# Patient Record
Sex: FEMALE | Race: WHITE | NOT HISPANIC OR LATINO | Employment: UNEMPLOYED | URBAN - METROPOLITAN AREA
[De-identification: names, ages, dates, MRNs, and addresses within clinical notes are randomized per-mention and may not be internally consistent; named-entity substitution may affect disease eponyms.]

---

## 2018-03-19 ENCOUNTER — OFFICE VISIT (OUTPATIENT)
Dept: NEUROSURGERY | Facility: CLINIC | Age: 38
End: 2018-03-19

## 2018-03-19 VITALS
TEMPERATURE: 99.3 F | BODY MASS INDEX: 24.69 KG/M2 | SYSTOLIC BLOOD PRESSURE: 138 MMHG | DIASTOLIC BLOOD PRESSURE: 92 MMHG | HEIGHT: 64 IN | WEIGHT: 144.6 LBS

## 2018-03-19 DIAGNOSIS — M51.36 BULGING LUMBAR DISC: Primary | ICD-10-CM

## 2018-03-19 PROCEDURE — 99203 OFFICE O/P NEW LOW 30 MIN: CPT | Performed by: NEUROLOGICAL SURGERY

## 2018-03-19 RX ORDER — HYDROCODONE BITARTRATE AND ACETAMINOPHEN 10; 325 MG/1; MG/1
TABLET ORAL
COMMUNITY
Start: 2018-03-01

## 2018-03-19 RX ORDER — SUMATRIPTAN 100 MG/1
TABLET, FILM COATED ORAL
COMMUNITY
Start: 2018-01-19

## 2018-03-19 RX ORDER — OMEPRAZOLE 20 MG/1
CAPSULE, DELAYED RELEASE ORAL
COMMUNITY
Start: 2018-02-13

## 2018-03-19 NOTE — PROGRESS NOTES
NAME: IRIS SINGH   DOS: 3/19/2018  : 1980  PCP: Jewel Vasquez MD    Chief Complaint:  Back pain urinary incontinence  Chief Complaint   Patient presents with   • Back Pain       History of Present Illness:  37 y.o. female   This is a 37-year-old female with a history of some chronic back and work-related injuries.  She is relatively functional and interestingly motor she reports about a 2 month history of back pain with radiation in the left groin after lifting a piece of furniture.  The pain was very severe radiated into her groin she also has complaints of some abdominal pain she's actually reporting that she is a candidate for an abdominal hysterectomy.  She denies symptoms of neurogenic claudication but has patchy distribution sensation issues all the way down the leg ever since this happened some of which have resolved.  She denies any amalia weakness the pain is mostly in the groin and radiates around her left L5 distribution into the groin but does have some diffuse abdominal issues being addressed by her GYN she reports also a history of a rash that broke out after this in her legs associated with some necrosis of her toes that is being worked up as well that is unclear as to the etiology    PMHX  Allergies:  Allergies   Allergen Reactions   • Amoxicillin Hives     Medications    Current Outpatient Prescriptions:   •  HYDROcodone-acetaminophen (NORCO)  MG per tablet, , Disp: , Rfl:   •  omeprazole (priLOSEC) 20 MG capsule, , Disp: , Rfl:   •  SUMAtriptan (IMITREX) 100 MG tablet, , Disp: , Rfl:   Past Medical History:  Past Medical History:   Diagnosis Date   • Headache      Past Surgical History:  History reviewed. No pertinent surgical history.  Social Hx:  Social History   Substance Use Topics   • Smoking status: Current Every Day Smoker     Packs/day: 0.50     Types: Cigarettes   • Smokeless tobacco: Never Used   • Alcohol use Yes      Comment: moderate     Family Hx:  Family History    Problem Relation Age of Onset   • No Known Problems Mother    • Cancer Father    • Cancer Sister      Review of Systems:        Review of Systems   Constitutional: Negative for activity change, appetite change, chills, diaphoresis, fatigue, fever and unexpected weight change.   HENT: Negative for congestion, dental problem, drooling, ear discharge, ear pain, facial swelling, hearing loss, mouth sores, nosebleeds, postnasal drip, rhinorrhea, sinus pressure, sneezing, sore throat, tinnitus, trouble swallowing and voice change.    Eyes: Negative for photophobia, pain, discharge, redness, itching and visual disturbance.   Respiratory: Negative for apnea, cough, choking, chest tightness, shortness of breath, wheezing and stridor.    Cardiovascular: Negative for chest pain, palpitations and leg swelling.   Gastrointestinal: Negative for abdominal distention, abdominal pain, anal bleeding, blood in stool, constipation, diarrhea, nausea, rectal pain and vomiting.   Endocrine: Negative for cold intolerance, heat intolerance, polydipsia, polyphagia and polyuria.   Genitourinary: Negative for decreased urine volume, difficulty urinating, dysuria, enuresis, flank pain, frequency, genital sores, hematuria and urgency.   Musculoskeletal: Positive for back pain. Negative for arthralgias, gait problem, joint swelling, myalgias, neck pain and neck stiffness.   Skin: Negative for color change, pallor, rash and wound.   Allergic/Immunologic: Negative for environmental allergies, food allergies and immunocompromised state.   Neurological: Negative for dizziness, tremors, seizures, syncope, facial asymmetry, speech difficulty, weakness, light-headedness, numbness and headaches.   Hematological: Negative for adenopathy. Does not bruise/bleed easily.   Psychiatric/Behavioral: Negative for agitation, behavioral problems, confusion, decreased concentration, dysphoric mood, hallucinations, self-injury, sleep disturbance and suicidal ideas.  The patient is not nervous/anxious and is not hyperactive.    All other systems reviewed and are negative.     I have reviewed this note template and all pertinent parts of the review of systems social, family history, surgical history and medication list      Physical Examination:  Vitals:    03/19/18 1128   BP: 138/92   Temp: 99.3 °F (37.4 °C)      General Appearance:   Well developed, well nourished, well groomed, alert, and cooperative.  Neurological examination:  Neurologic Exam  Vital signs were reviewed and documented in the chart  Patient appeared in good neurologic function with normal comprehension fluent speech  Mood and affect are normal  Sense of smell deferred    Pupils symmetric equally reactive funduscopic exam not visualized   Visual fields intact to confrontation  Extraocular movements intact  Face motor function is symmetric  Facial sensations normal  Hearing intact to finger rub hearing intact to finger rub  Tongue is midline  Palate symmetric  Swallowing normal  Shoulder shrug normal    Muscle bulk and tone normal  5 out of 5 strength no motor drift  Gait normal intact  Negative Romberg  No clonus long tract signs or myelopathy  Suspended thoracic sensory level  Reflexes symmetric  No edema noted and extremities skin appears normal  Vibratory sensations intact  Straight leg raise sign absent  No signs of intrinsic hip dysfunction  Back is without any lesions or abnormality  Feet are warm and well perfused she doesn't really have a great pulse on the left        Review of Imaging/DATA:  I reviewed her MRI of her thoracic it's unremarkable the cord and conus looks okay the lumbar spine shows a mild disc bulge at L4 5 but nothing surgical  Diagnoses/Plan:    Ms. Tran is a 37 y.o. female   I agree with Dr. Rosales for symptoms are somewhat concerning also the urinary an overflow type of incontinence issues are bothersome but she does manifest no evidence of myelopathy the symptoms are probably  referred pain from L5 radiculopathy and her abdominal pain is just that I think abdominal pain related to her U Abel issue I recommended that she go ahead and get that addressed as a source of her abdominal discomfort I recommended that she per take of some physical therapy etc. and we can temporize her with a lumbar epidural steroid block regarding her L4 5 degenerative disc disease.  We could contemplate a lumbar fusion at some point in the road however she is young she has a multitude of complaints and some of her symptoms are discordant andpatient to have a tendency to be less impressed with outcomes of surgical fusion.    I explained all this buffed to her I explained also if there is any other symptoms that should arise down the road she should come back and see me but right now I recommended nonsurgical care physical therapy nonsteroidals etc. and to have her abdominal issues discussed with her GYN.  I am can order an exercise JOHNATHON distal ensure that there is no flow-limiting stenosis but I think it would be highly unusual in a patient her age.  I also explained that there may be a rheumatologic issue at brewing here and to pay attention and if any of her symptoms recurred to seek treatment quickly

## 2018-03-21 ENCOUNTER — DOCUMENTATION (OUTPATIENT)
Dept: NEUROSURGERY | Facility: CLINIC | Age: 38
End: 2018-03-21

## 2018-04-03 ENCOUNTER — TELEPHONE (OUTPATIENT)
Dept: NEUROSURGERY | Facility: CLINIC | Age: 38
End: 2018-04-03

## 2018-04-03 NOTE — TELEPHONE ENCOUNTER
Pernell returned [facility]'s call to let her know that Mrs. Tran did not show for her apt on 03/29/18.  Gali spain.

## 2023-08-01 ENCOUNTER — TRANSCRIBE ORDERS (OUTPATIENT)
Dept: ADMINISTRATIVE | Facility: HOSPITAL | Age: 43
End: 2023-08-01
Payer: COMMERCIAL

## 2023-08-01 DIAGNOSIS — N63.13 BREAST LUMP ON RIGHT SIDE AT 7 O'CLOCK POSITION: Primary | ICD-10-CM

## 2023-08-08 ENCOUNTER — HOSPITAL ENCOUNTER (OUTPATIENT)
Dept: MAMMOGRAPHY | Facility: HOSPITAL | Age: 43
Discharge: HOME | End: 2023-08-08
Payer: COMMERCIAL

## 2023-08-08 ENCOUNTER — HOSPITAL ENCOUNTER (OUTPATIENT)
Dept: ULTRASOUND IMAGING | Facility: HOSPITAL | Age: 43
Discharge: HOME | End: 2023-08-08
Payer: COMMERCIAL

## 2023-08-08 DIAGNOSIS — N63.13 BREAST LUMP ON RIGHT SIDE AT 7 O'CLOCK POSITION: ICD-10-CM

## 2023-08-08 PROCEDURE — 77066 DX MAMMO INCL CAD BI: CPT | Performed by: RADIOLOGY

## 2023-08-08 PROCEDURE — 77062 BREAST TOMOSYNTHESIS BI: CPT | Performed by: RADIOLOGY

## 2023-08-08 PROCEDURE — G0279 TOMOSYNTHESIS, MAMMO: HCPCS

## 2023-08-08 PROCEDURE — 77066 DX MAMMO INCL CAD BI: CPT

## 2023-08-08 PROCEDURE — 76642 ULTRASOUND BREAST LIMITED: CPT

## 2023-08-08 PROCEDURE — 76642 ULTRASOUND BREAST LIMITED: CPT | Performed by: RADIOLOGY
